# Patient Record
Sex: MALE | Race: BLACK OR AFRICAN AMERICAN | Employment: UNEMPLOYED | URBAN - METROPOLITAN AREA
[De-identification: names, ages, dates, MRNs, and addresses within clinical notes are randomized per-mention and may not be internally consistent; named-entity substitution may affect disease eponyms.]

---

## 2021-01-07 ENCOUNTER — OFFICE VISIT (OUTPATIENT)
Dept: ORTHOPEDIC SURGERY | Age: 46
End: 2021-01-07
Payer: OTHER GOVERNMENT

## 2021-01-07 VITALS
OXYGEN SATURATION: 98 % | HEART RATE: 69 BPM | TEMPERATURE: 97.6 F | HEIGHT: 72 IN | SYSTOLIC BLOOD PRESSURE: 129 MMHG | BODY MASS INDEX: 29.8 KG/M2 | WEIGHT: 220 LBS | DIASTOLIC BLOOD PRESSURE: 80 MMHG

## 2021-01-07 DIAGNOSIS — M48.02 CERVICAL SPINAL STENOSIS: Primary | ICD-10-CM

## 2021-01-07 DIAGNOSIS — M79.18 MYOFASCIAL PAIN: ICD-10-CM

## 2021-01-07 DIAGNOSIS — M47.816 LUMBAR FACET ARTHROPATHY: ICD-10-CM

## 2021-01-07 DIAGNOSIS — M79.2 NEURITIS: ICD-10-CM

## 2021-01-07 DIAGNOSIS — M62.838 MUSCLE SPASM: ICD-10-CM

## 2021-01-07 DIAGNOSIS — M51.36 DDD (DEGENERATIVE DISC DISEASE), LUMBAR: ICD-10-CM

## 2021-01-07 PROCEDURE — 99203 OFFICE O/P NEW LOW 30 MIN: CPT | Performed by: PHYSICAL MEDICINE & REHABILITATION

## 2021-01-07 RX ORDER — GABAPENTIN 300 MG/1
CAPSULE ORAL
Qty: 60 CAP | Refills: 1 | Status: SHIPPED | OUTPATIENT
Start: 2021-01-07 | End: 2021-04-09

## 2021-01-07 NOTE — PATIENT INSTRUCTIONS

## 2021-01-07 NOTE — LETTER
1/8/2021 Patient: Jackie Astudillo YOB: 1975 Date of Visit: 1/7/2021 Isauro Hodges NP 
[address] [address] [place] [address] [address] Fax: [phone] Dear Isauro Hodges NP, Thank you for referring Mr. Jackie Astudillo to [address] [place] [address] for evaluation. My notes for this consultation are attached. If you have questions, please do not hesitate to call me. I look forward to following your patient along with you. Sincerely, Lv Oliva MD

## 2021-01-07 NOTE — PROGRESS NOTES
[facility]  [address] [place] [address], [facility] [address]  Phone: [phone]  Fax: [phone]    NEW PATIENT  Pt's YOB: 1975    ASSESSMENT   Diagnoses and all orders for this visit:    1. Cervical spinal stenosis  -     REFERRAL TO PHYSICAL THERAPY    2. Neuritis  -     REFERRAL TO PHYSICAL THERAPY  -     EMG TWO EXTREMITIES LOWER; Future  -     gabapentin (Neurontin) 300 mg capsule; Take 1 cap by mouth at night for 1 week then increase to 2 caps as directed. Max Daily Amount: 600 mg.    3. Myofascial pain  -     REFERRAL TO PHYSICAL THERAPY    4. Muscle spasm  -     REFERRAL TO PHYSICAL THERAPY    5. DDD (degenerative disc disease), lumbar  -     REFERRAL TO PHYSICAL THERAPY    6. Lumbar facet arthropathy  -     REFERRAL TO PHYSICAL THERAPY         IMPRESSION AND PLAN:  Arturo Velez is a 41 y.o. male with a long history of neck and lower back pain. Pt complains of nagging pain in the neck that intermittently radiates down the right arm. He also complains of numbness in the plantar aspect of both feet every morning but denies any pain or numbness radiating down the legs at this time. 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 He has been taking Tylenol intermittently as needed. 1) He was given information on cervical and lumbar arthritis exercises. 2) Pt was given information on how to use a TheraCane. 3) He was referred to cervical and lumbar physical therapy for range of motion, modalities, and traction-- [facility]. 4) A lower extremity EMG was ordered to further assess for radiculopathy or neuropathy since he has persistent foot numbness-- [facility] also [facility] about [facility] to [facility] this diagnostic study.    5) Pt was prescribed Neurontin 300 mg 2 caps QHS, tapering up as directed for his neuropathic symptoms. 6) Mr. Jaclyn Koenig has a reminder for a \"due or due soon\" health maintenance. I have asked that he contact his primary care provider, UNKNOWN, for follow-up on this health maintenance. 7)  demonstrated consistency with prescribing. Follow-up and Dispositions    · Return in about 6 weeks (around 2/18/2021) for Medication follow up, PT follow up, Diagnostic Test follow up. HISTORY OF PRESENT ILLNESS:  Arthur Stock is a 41 y.o. male with a long history of neck and lower back pain. He presents to the office today as a new patient. Pt complains of nagging pain in the neck that intermittently radiates down the right arm. He reports intermittent numbness in the right arm that extends into the 4th and 5th digits when he rests on his elbows. Pt admits that his pain disrupts his sleep. He also complains of numbness in the plantar aspect of both feet every morning but denies any pain or numbness radiating down the legs at this time. Pt notes that his foot numbness generally improves within the first couple steps after getting out of bed. His symptoms generally improve when standing, lying down, and walking but worsen when sitting, changing positions, sneezing/coughing, lifting, and bending forward. He denies any loss of manual dexterity or bowel/bladder control at this time but admits to occasional issues with balance and difficulty with buttons. Pt also denies any weakness in the arms/legs or dragging his feet but notes that he very occasionally stumbles when walking. 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 Pt reports that he was doing grass drills and notes the next morning, he experienced numbness in his legs/feet. He reports that his symptoms did not improve and when he followed up with medical he was prescribed ibuprofen. Pt notes that he still did not experience relief, had a lumbar MRI, and was told he had a bulging disc.  He notes that he was then medically discharged 6 months after the onset of symptoms, noting he was in the [employer] for about 1 year total. Pt notes that he tried to return to work but was then placed on disability for his lower back. He reports that he followed up with a provider at the [place] for years, underwent a lower extremity EMG in the early 2000's, but did not experience any improvement in his symptoms and discontinued follow up appointments. Pt then returned to the [place] and requests another provider when he pain started to worsen several years ago. He had a left rotator cuff repair in 2017 by Dr. Ana Luisa Nayak with improvement in his range of motion but he notes that he continued to experience nagging pain in the neck and right arm. His provider at the [place] then ordered cervical and lumbar MRI's. He reports minimal relief when attending physical therapy in the past. Pt has been taking Tylenol intermittently as needed and denies taking neuropathic medications. Pt denies any history of glaucoma or renal stones. Pt at this time desires to proceed with physical therapy, medication evaluation, and a lower extremity EMG. Of note, he is a nonsmoker. Pain Scale: 6/10     PCP: UNKNOWN    History reviewed. No pertinent past medical history.      Social History     Socioeconomic History    Marital status: OTHER     Spouse name: Not on file    Number of children: Not on file    Years of education: Not on file    Highest education level: Not on file   Occupational History    Not on file   Social Needs    Financial resource strain: Not on file    Food insecurity     Worry: Not on file     Inability: Not on file    Transportation needs     Medical: Not on file     Non-medical: Not on file   Tobacco Use    Smoking status: Never Smoker    Smokeless tobacco: Never Used   Substance and Sexual Activity    Alcohol use: Not Currently    Drug use: Never    Sexual activity: Not on file   Lifestyle    Physical activity     Days per week: Not on file     Minutes per session: Not on file    Stress: Not on file   Relationships    Social connections     Talks on phone: Not on file     Gets together: Not on file     Attends [employer] service: Not on file     Active member of club or organization: Not on file     Attends meetings of clubs or organizations: Not on file     Relationship status: Not on file    Intimate partner violence     Fear of current or ex partner: Not on file     Emotionally abused: Not on file     Physically abused: Not on file     Forced sexual activity: Not on file   Other Topics Concern    Not on file   Social History Narrative    Not on file           No Known Allergies    REVIEW OF SYSTEMS    Constitutional: Negative for fever, chills, or weight change. Respiratory: Negative for cough or shortness of breath. Cardiovascular: Negative for chest pain or palpitations. Gastrointestinal: Negative for acid reflux, change in bowel habits, or constipation. Genitourinary: Negative for dysuria and flank pain. Musculoskeletal: Positive for cervical and lumbar pain. Skin: Negative for rash. Neurological: Negative for headaches or dizziness. Positive for numbness. Endo/Heme/Allergies: Negative for increased bruising. Psychiatric/Behavioral: Positive for difficulty with sleep. As per HPI    PHYSICAL EXAMINATION  Visit Vitals  /80 (BP 1 Location: Left arm, BP Patient Position: Sitting)   Pulse 69   Temp 97.6 °F (36.4 °C) (Temporal)   Ht 6' (1.829 m)   Wt 220 lb (99.8 kg)   SpO2 98%   BMI 29.84 kg/m²       Constitutional: Awake, alert, and in no acute distress. HEENT: Normocephalic. Atraumatic. Oropharynx is moist and clear. PERRL. EOMI. Sclerae are nonicteric  Cardiovascular: Regular rate and rhythm  Lungs: Clear to auscultation bilaterally  Abdomen: Soft and nontender. Bowel sounds are present  Neurological: 1+ symmetrical DTRs in the upper extremities. 1+ symmetrical DTRs in the lower extremities.  Decreased sensation to light touch in the left arm, otherwise sensation is intact. Negative Aguilar's sign bilaterally. Skin: warm, dry, and intact. Musculoskeletal: Tight across the upper trapezius bilaterally with scattered trigger points. Good range of motion in the cervical spine on all planes. Negative Spurling's test. Tenderness to palpation in the lower lumbar region. Improvement with extension. No change with axial loading. Pain with forward flexion. No pain with internal or external rotation of his hips. Negative straight leg raise bilaterally. No pain with heel or toe walking. No difficulty with the single leg stance bilaterally. Biceps  Triceps Deltoids Wrist Ext Wrist Flex Hand Intrin   Right +4/5 +4/5 +4/5 +4/5 +4/5 +4/5   Left +4/5 +4/5 +4/5 +4/5 +4/5 +4/5      Hip Flex  Quads Hamstrings Ankle DF EHL Ankle PF   Right +4/5 +4/5 +4/5 +4/5 +4/5 +4/5   Left +4/5 +4/5 +4/5 +4/5 +4/5 +4/5     IMAGING:    Cervical spine MRI from 11/19/2020 was personally reviewed with the patient and demonstrated:  IMPRESSION:  Multilevel spondylosis most pronounced at C3-C4 where a posterior disc bulge contacts and deforms the cord. There is severe bilateral neuroforaminal stenosis at this level as well. Replacement of the bone marrow of uncertain significance. Typically, this is an incidental finding due to a nonaggressive  process such as obesity, anemia or even smoking. However, in rare instances, this can be caused by a marrow replacing process such as leukemia and correlation with patient history and laboratory values to rule out a more aggressive process is appreciated. Lumbar spine MRI from 11/19/2020 was personally reviewed with the patient and demonstrated:  IMPRESSION:  1. Multilevel spondylosis most pronounced at L5-S1 where there is near complete loss of the disc height. A circumferential disc bulge may contact the adjacent left S1 nerve root and there is left greater than right neural foraminal stenosis.   2. Replacement of the bone marrow of uncertain significance. Typically, this is an incidental finding due to a nonaggressive  process such as obesity, anemia or even smoking. However, in rare instances, this can be caused by a marrow replacing process such as leukemia and correlation with patient history and laboratory values to rule out a more aggressive process is appreciated. Written by Dickie Lanes, as dictated by Enrique Stoll MD.  I, Dr. Enrique Stoll confirm that all documentation is accurate.

## 2021-01-08 DIAGNOSIS — M79.2 NEURITIS: ICD-10-CM

## 2021-01-14 ENCOUNTER — APPOINTMENT (OUTPATIENT)
Dept: PHYSICAL THERAPY | Age: 46
End: 2021-01-14
Attending: PHYSICAL MEDICINE & REHABILITATION

## 2021-02-24 ENCOUNTER — OFFICE VISIT (OUTPATIENT)
Dept: ORTHOPEDIC SURGERY | Age: 46
End: 2021-02-24
Payer: OTHER GOVERNMENT

## 2021-02-24 VITALS
WEIGHT: 220 LBS | BODY MASS INDEX: 29.8 KG/M2 | HEIGHT: 72 IN | DIASTOLIC BLOOD PRESSURE: 78 MMHG | SYSTOLIC BLOOD PRESSURE: 131 MMHG | HEART RATE: 69 BPM

## 2021-02-24 DIAGNOSIS — R20.0 NUMBNESS OF LEFT FOOT: Primary | ICD-10-CM

## 2021-02-24 DIAGNOSIS — R20.0 NUMBNESS OF LEFT FOOT: ICD-10-CM

## 2021-02-24 PROCEDURE — 95886 MUSC TEST DONE W/N TEST COMP: CPT | Performed by: PHYSICAL MEDICINE & REHABILITATION

## 2021-02-24 PROCEDURE — 95908 NRV CNDJ TST 3-4 STUDIES: CPT | Performed by: PHYSICAL MEDICINE & REHABILITATION

## 2021-02-24 NOTE — PROGRESS NOTES
[address], [address]  [place], [address] [place] [address] Street  Phone: [phone]  Fax: [phone]        Estefania Living  : 1975  PCP: Torsten Murphy  2021    ELECTROMYOGRAPHY AND NERVE CONDUCTION STUDIES    Miguel Ángel Bob was referred by Jie Field for electrodiagnostic evaluation of left foot paraesthesia. NCV & EMG Findings:  Evaluation of the left sural sensory nerve showed prolonged distal peak latency (4.1 ms) and decreased conduction velocity (Calf-Lat Mall, 34 m/s). All remaining nerves (as indicated in the following tables) were within normal limits. All examined muscles (as indicated in the following table) showed no evidence of electrical instability. INTERPRETATION  This is a minimally abnormal electrodiagnostic examination. These findings may be consistent with:   1. Minimal left sural mononeuropathy - this is a typical for age finding that may be a harbinger for peripheral neuropathy    There is no electrodiagnostic evidence of lumbar radiculopathy, lumbosacral plexopathy, or myopathy. CLINICAL INTERPRETATION  His electrodiagnostic finding could possibly explain his foot numbness, but it is unlikely given the lack of severity. HISTORY OF PRESENT ILLNESS  Laura Wylie is a 41 y.o. male. Pt presents today for LLE EMG evaluation of numbness in the plantar aspect of the feet that is worse in the morning. He notes that sometimes he has sharp pains in the BLE into the big toe. His symptoms have been progressive over the years. He initially injured his back in . PAST MEDICAL HISTORY   No past medical history on file. No past surgical history on file. Ronald Ra MEDICATIONS      Current Outpatient Medications   Medication Sig Dispense Refill    gabapentin (Neurontin) 300 mg capsule Take 1 cap by mouth at night for 1 week then increase to 2 caps as directed.  Max Daily Amount: 600 mg. 60 Cap 1        ALLERGIES  No Known Allergies SOCIAL HISTORY    Social History     Socioeconomic History    Marital status: OTHER     Spouse name: Not on file    Number of children: Not on file    Years of education: Not on file    Highest education level: Not on file   Tobacco Use    Smoking status: Never Smoker    Smokeless tobacco: Never Used   Substance and Sexual Activity    Alcohol use: Not Currently    Drug use: Never       FAMILY HISTORY  No family history on file. PHYSICAL EXAMINATION  Visit Vitals  /78   Pulse 69   Ht 6' (1.829 m)   Wt 220 lb (99.8 kg)   BMI 29.84 kg/m²       Pain Assessment  1/7/2021   Location of Pain Back;Neck   Severity of Pain 6   Quality of Pain Aching   Duration of Pain Persistent   Frequency of Pain Constant   Aggravating Factors Bending; Other (Comment)   Aggravating Factors Comment sitting, lifting   Relieving Factors Other (Comment)   Relieving Factors Comment standing, walking   Result of Injury Yes   Work-Related Injury No   Type of Injury Other (Comment)   Type of Injury Comment basic training grass drills           Constitutional:  Well developed, well nourished, in no acute distress. Psychiatric: Affect and mood are appropriate. Integumentary: No rashes or abrasions noted on exposed areas. SPINE/MUSCULOSKELETAL EXAM    On brief examination: Intact sensation.       NCV & EMG Findings:  Nerve Conduction Studies  Anti Sensory Summary Table     Stim Site NR Peak (ms) Norm Peak (ms) O-P Amp (µV) Norm O-P Amp Site1 Site2 Delta-P (ms) Dist (cm) Kush (m/s) Norm Kush (m/s)   Left Sup Fibular Anti Sensory (Ant Lat Mall)   14 cm    3.6 <4.4 15.2 >5.0 14 cm Ant Lat Mall 3.6 14.0 39 >32   Left Sural Anti Sensory (Lat Mall)   Calf    4.1 <4.0 6.5 >5.0 Calf Lat Mall 4.1 14.0 34 >35   Site 2    4.2  3.1            Motor Summary Table     Stim Site NR Onset (ms) Norm Onset (ms) O-P Amp (mV) Norm O-P Amp Site1 Site2 Delta-0 (ms) Dist (cm) Kush (m/s) Norm Kush (m/s)   Left Fibular Motor (Ext Dig Brev)   Ankle 4. 6 <6.1 7.1 >2.5 B Fib Ankle 7.3 34.5 47 >38   B Fib    11.9  6.8  Poplt B Fib 1.4 6.0 43 >40   Poplt    13.3  6.6          Left Tibial Motor (Abd Miller Brev)   Ankle    4.5 <6.1 8.4 >3.0 Knee Ankle 9.7 38.5 40 >35   Knee    14.2  6.6            EMG     Side Muscle Nerve Root Ins Act Fibs Psw Amp Dur Poly Recrt Int Crista Poles Comment   Left VastusMed Femoral L2-4 Nml Nml Nml Nml Nml 0 Nml Nml    Left AntTibialis Dp Br Fibular L4-5 Nml Nml Nml Nml Nml 0 Nml Nml    Left Gastroc Tibial S1-2 Nml Nml Nml Nml Nml 0 Nml Nml    Left ExtHallLong Dp Br Fibular L5, S1 Nml Nml Nml Nml Nml 0 Nml Nml    Left PostTibialis Tibial L5, S1 Nml Nml Nml Nml Nml 0 Nml Nml    Left Lumbo Parasp Up Rami L1-2 Nml Nml Nml         Left Lumbo Parasp Mid Rami L3-4 Nml Nml Nml         Left Lumbo Parasp Low Rami L5-S1 Nml Nml Nml             Nerve Conduction Studies  Anti Sensory Left/Right Comparison     Stim Site L Lat (ms) R Lat (ms) L-R Lat (ms) L Amp (µV) R Amp (µV) L-R Amp (%) Site1 Site2 L Kush (m/s) R Kush (m/s) L-R Kush (m/s)   Sup Fibular Anti Sensory (Ant Lat Mall)   14 cm 3.6   15.2   14 cm Ant Lat Mall 39     Sural Anti Sensory (Lat Mall)   Calf 4.1   6.5   Calf Lat Mall 34     Site 2 4.2   3.1            Motor Left/Right Comparison     Stim Site L Lat (ms) R Lat (ms) L-R Lat (ms) L Amp (mV) R Amp (mV) L-R Amp (%) Site1 Site2 L Kush (m/s) R Kush (m/s) L-R Kush (m/s)   Fibular Motor (Ext Dig Brev)   Ankle 4.6   7.1   B Fib Ankle 47     B Fib 11.9   6.8   Poplt B Fib 43     Poplt 13.3   6.6          Tibial Motor (Abd Miller Brev)   Ankle 4.5   8.4   Knee Ankle 40     Knee 14.2   6.6                Waveforms:                    [facility]  OFFICE PROCEDURE PROGRESS NOTE        Chart reviewed for the following:   Huyen OCONNELL, have reviewed the History, Physical and updated the Allergic reactions for [facility]     TIME OUT performed immediately prior to start of procedure:   Huyen OCONNELL, have performed the following reviews on [facility] prior to the start of the procedure:            * Patient was identified by name and date of birth   * Agreement on procedure being performed was verified  * Risks and Benefits explained to the patient  * Procedure site verified and marked as necessary  * Patient was positioned for comfort  * Consent was signed and verified     Time: 9:42 AM    Date of procedure: 2/24/2021    Procedure performed by:  Ny Black MD    Provider accompanied by: Bear. Patient accompanied by: Self.     How tolerated by patient: tolerated the procedure well with no complications    Post Procedural Pain Scale: 0 - No Hurt    Comments: none    Written by Maynor Velásquez as dictated by Mary Christina MD

## 2021-03-03 NOTE — PROGRESS NOTES
[facility]  [address] [facility], [facility] [place]   Phone: [phone]  Fax: [phone]    Pt's YOB: 1975    ASSESSMENT   Diagnoses and all orders for this visit:    1. Cervical spinal stenosis    2. Left lumbar radiculopathy  -     pregabalin (Lyrica) 75 mg capsule; Take 2 caps by mouth in the morning and 2 at night as directed. 3. Neuritis  -     pregabalin (Lyrica) 75 mg capsule; Take 2 caps by mouth in the morning and 2 at night as directed. 4. Lumbar facet arthropathy         IMPRESSION AND PLAN:  Carin Skelton is a 41 y.o. male with history of cervical and lumbar pain. He complains of intermittent nagging pain in the neck that radiates down the right arm. Pt also complains of pain in the lower back that radiates down the left leg into the great toe. He is attending physical therapy without improvement. Pt also reports minimal relief when taking Neurontin 300 mg 1 cap QAM and 2 caps QHS. 1) Pt was given information on cervical and lumbar arthritis exercises. 2) I would recommend follow up with a oncologist/hematologist regarding his MRI findings. 3) Pt was prescribed Lyrica 75 mg 2 caps QAM and 2 caps QHS, tapering up as directed. 4) Discussed scheduling a left L5 SNRB if needed. 5) He will discontinue physical therapy since it is not effective. 6) Mr. Mathew Caballero has a reminder for a \"due or due soon\" health maintenance. I have asked that he contact his primary care provider, UNKNOWN, for follow-up on this health maintenance. 7) [facility] demonstrated consistency with prescribing. Follow-up and Dispositions    · Return in about 5 weeks (around 4/8/2021) for Medication follow up. HISTORY OF PRESENT ILLNESS:  Carin Skelton is a 41 y.o. male with history of cervical and lumbar pain and presents to the office today for follow up. He complains of intermittent nagging pain in the neck that radiates down the right arm.  Pt also complains of pain in the lower back that radiates down the left leg into the great toe. His leg pain is more severe in the morning and when lying in bed. Pt reports intermittent numbness in the right arm when leaning on his arm and numbness in both legs. He reports minimal relief with physical therapy since his last office visit. Pt denies attending aquatic physical therapy. He had a cervical and lumbar MRI through the [facility] but notes that he has not followed up with anyone since the imaging study. Pt notes that he had lab workup and according to the patient all his results were normal. Of note, he is a nonsmoker. Pt notes that he has discussed lumbar surgery. He denies any previous lumbar injections. He underwent a bilateral lower extremity EMG on 2/24/2021 with Dr. Daksha Ortega which was suggestive of minimal left sural mononeuropathy. Pt reports minimal relief when taking Neurontin 300 mg 1 cap QAM and 2 caps QHS. He ran out of the Neurontin and denies any sedation with the medication. Pt denies a history of kidney stones or glaucoma and has never taking Topamax or Lyrica. He also denies any history of diabetes mellitus. Pt at this time desires to proceed with medication evaluation. Pain Scale: 2/10    PCP: UNKNOWN     History reviewed. No pertinent past medical history.      Social History     Socioeconomic History    Marital status: OTHER     Spouse name: Not on file    Number of children: Not on file    Years of education: Not on file    Highest education level: Not on file   Occupational History    Not on file   Social Needs    Financial resource strain: Not on file    Food insecurity     Worry: Not on file     Inability: Not on file    Transportation needs     Medical: Not on file     Non-medical: Not on file   Tobacco Use    Smoking status: Never Smoker    Smokeless tobacco: Never Used   Substance and Sexual Activity    Alcohol use: Not Currently    Drug use: Never    Sexual activity: Not on file Lifestyle    Physical activity     Days per week: Not on file     Minutes per session: Not on file    Stress: Not on file   Relationships    Social connections     Talks on phone: Not on file     Gets together: Not on file     Attends Gnosticist service: Not on file     Active member of club or organization: Not on file     Attends meetings of clubs or organizations: Not on file     Relationship status: Not on file    Intimate partner violence     Fear of current or ex partner: Not on file     Emotionally abused: Not on file     Physically abused: Not on file     Forced sexual activity: Not on file   Other Topics Concern    Not on file   Social History Narrative    Not on file       Current Outpatient Medications   Medication Sig Dispense Refill    pregabalin (Lyrica) 75 mg capsule Take 2 caps by mouth in the morning and 2 at night as directed. 120 Cap 1    gabapentin (Neurontin) 300 mg capsule Take 1 cap by mouth at night for 1 week then increase to 2 caps as directed. Max Daily Amount: 600 mg. 60 Cap 1       No Known Allergies      REVIEW OF SYSTEMS    Constitutional: Negative for fever, chills, or weight change. Respiratory: Negative for cough or shortness of breath. Cardiovascular: Negative for chest pain or palpitations. Gastrointestinal: Negative for acid reflux, change in bowel habits, or constipation. Genitourinary: Negative for dysuria and flank pain. Musculoskeletal: Positive for lumbar pain. Neurological: Negative for headaches or dizziness. Positive for numbness. Endo/Heme/Allergies: Negative for increased bruising. Psychiatric/Behavioral: Positive for difficulty with sleep. As per HPI    PHYSICAL EXAMINATION  Visit Vitals  /78 (BP 1 Location: Left upper arm)   Pulse 86   Temp 97.3 °F (36.3 °C)   Resp 19   Ht 5' 11\" (1.803 m)   Wt 216 lb (98 kg)   SpO2 99%   BMI 30.13 kg/m²       Constitutional: Awake, alert, and in no acute distress.   Neurological: 1+ symmetrical DTRs in the upper extremities. 1+ symmetrical DTRs in the lower extremities. Sensation to light touch is intact. Negative Aguilar's sign bilaterally. Skin: warm, dry, and intact. Musculoskeletal: Tenderness to palpation in the lower lumbar region. Improvement with extension. No change with axial loading. Pain with forward flexion. No pain with internal or external rotation of his hips. Negative straight leg raise bilaterally. Biceps  Triceps Deltoids Wrist Ext Wrist Flex Hand Intrin   Right +4/5 +4/5 +4/5 +4/5 +4/5 +4/5   Left +4/5 +4/5 +4/5 +4/5 +4/5 +4/5      Hip Flex  Quads Hamstrings Ankle DF EHL Ankle PF   Right +4/5 +4/5 +4/5 +4/5 +4/5 +4/5   Left +4/5 +4/5 +4/5 +4/5 +4/5 +4/5     IMAGING:    Cervical spine MRI from 11/19/2020 was personally reviewed with the patient and demonstrated:  IMPRESSION:  Multilevel spondylosis most pronounced at C3-C4 where a posterior disc bulge contacts and deforms the cord. There is severe bilateral neuroforaminal stenosis at this level as well.      Replacement of the bone marrow of uncertain significance. Typically, this is an incidental finding due to a nonaggressive  process such as obesity, anemia or even smoking. However, in rare instances, this can be caused by a marrow replacing process such as leukemia and correlation with patient history and laboratory values to rule out a more aggressive process is appreciated. Lumbar spine MRI from 11/19/2020 was personally reviewed with the patient and demonstrated:  IMPRESSION:  1. Multilevel spondylosis most pronounced at L5-S1 where there is near complete loss of the disc height. A circumferential disc bulge may contact the adjacent left S1 nerve root and there is left greater than right neural foraminal stenosis. 2. Replacement of the bone marrow of uncertain significance. Typically, this is an incidental finding due to a nonaggressive  process such as obesity, anemia or even smoking.  However, in rare instances, this can be caused by a marrow replacing process such as leukemia and correlation with patient history and laboratory values to rule out a more aggressive process is appreciated. Bilateral lower extremity EMG from 2/24/2021 was personally reviewed with the patient and demonstrated:  NCV & EMG Findings:  Evaluation of the left sural sensory nerve showed prolonged distal peak latency (4.1 ms) and decreased conduction velocity (Calf-Lat Mall, 34 m/s). All remaining nerves (as indicated in the following tables) were within normal limits.       All examined muscles (as indicated in the following table) showed no evidence of electrical instability.       INTERPRETATION  This is a minimally abnormal electrodiagnostic examination. These findings may be consistent with:   1. Minimal left sural mononeuropathy - this is a typical for age finding that may be a harbinger for peripheral neuropathy     There is no electrodiagnostic evidence of lumbar radiculopathy, lumbosacral plexopathy, or myopathy.     CLINICAL INTERPRETATION  His electrodiagnostic finding could possibly explain his foot numbness, but it is unlikely given the lack of severity. Written by Concha Case, as dictated by Reid Joseph MD.  I, Dr. Reid Joseph confirm that all documentation is accurate.

## 2021-03-04 ENCOUNTER — TELEPHONE (OUTPATIENT)
Dept: ORTHOPEDIC SURGERY | Age: 46
End: 2021-03-04

## 2021-03-04 ENCOUNTER — OFFICE VISIT (OUTPATIENT)
Dept: ORTHOPEDIC SURGERY | Age: 46
End: 2021-03-04
Payer: COMMERCIAL

## 2021-03-04 VITALS
SYSTOLIC BLOOD PRESSURE: 115 MMHG | BODY MASS INDEX: 30.24 KG/M2 | TEMPERATURE: 97.3 F | DIASTOLIC BLOOD PRESSURE: 78 MMHG | HEIGHT: 71 IN | RESPIRATION RATE: 19 BRPM | WEIGHT: 216 LBS | OXYGEN SATURATION: 99 % | HEART RATE: 86 BPM

## 2021-03-04 DIAGNOSIS — R93.7 ABNORMAL MRI, CERVICAL SPINE: Primary | ICD-10-CM

## 2021-03-04 DIAGNOSIS — M47.816 LUMBAR FACET ARTHROPATHY: ICD-10-CM

## 2021-03-04 DIAGNOSIS — M54.16 LEFT LUMBAR RADICULOPATHY: ICD-10-CM

## 2021-03-04 DIAGNOSIS — R89.8 ABNORMAL BONE MARROW EXAMINATION: ICD-10-CM

## 2021-03-04 DIAGNOSIS — M79.2 NEURITIS: ICD-10-CM

## 2021-03-04 DIAGNOSIS — M48.02 CERVICAL SPINAL STENOSIS: Primary | ICD-10-CM

## 2021-03-04 DIAGNOSIS — R93.7 ABNORMAL MRI, LUMBAR SPINE: ICD-10-CM

## 2021-03-04 PROCEDURE — 99213 OFFICE O/P EST LOW 20 MIN: CPT | Performed by: PHYSICAL MEDICINE & REHABILITATION

## 2021-03-04 RX ORDER — PREGABALIN 75 MG/1
CAPSULE ORAL
Qty: 120 CAP | Refills: 1 | Status: SHIPPED | OUTPATIENT
Start: 2021-03-04 | End: 2021-03-10 | Stop reason: SDUPTHER

## 2021-03-04 NOTE — PATIENT INSTRUCTIONS

## 2021-03-04 NOTE — LETTER
3/5/2021 Patient: Petr Kiran YOB: 1975 Date of Visit: 3/4/2021 Angi Vela NP 
[address] [facility] [address] [facility] [id number] [address] Fax: [phone] Dear Angi Vela NP, Thank you for referring Mr. Petr Kiran to [address] [place] [address] for evaluation. My notes for this consultation are attached. If you have questions, please do not hesitate to call me. I look forward to following your patient along with you. Sincerely, Nghia White MD

## 2021-03-04 NOTE — TELEPHONE ENCOUNTER
Patient called stating he just left the office but he wanted to get a message to Dr. Baron Burgos. He says he just got off the phone with the [place] explaining to them Dr. Baron Burgos is wanting to refer him to a different specialist and they told him Dr. Baron Burgos should be able to do the referral for him and he's requesting to speak to someone regarding this. Please advise patient back at [phone].

## 2021-03-08 NOTE — TELEPHONE ENCOUNTER
I attempted to reach Mr. Shirley Sprague regarding this referral. He was not available so I left a message requesting a return call. I cannot make a referral directly to Hematology as it is a different provider group. The [facility] referral needs an addendum from the [facility] and should be sent to a participating hematology provider/group. According to the [address] [facility] referral, a stat/urgent referral can be made by contacting extension [phone] or [phone]. I attempted this for approximately 30 minutes with no results as no one would  on either extension. The other option was via e-mail. I sent a secure e-mail requesting a delivery and read receipt. The delivery receipt was returned as follows: Delivery to these recipients or groups is complete, but no delivery notification was sent by the destination :  [email or web address] ([email or web address])  Subject: Stat Referral Request Safemail    The read receipt was returned as follows: Your message      To:      Subject: Stat Referral Request Safemail     Sent: Monday, March 8, 2021 7:06:53 PM (UT+00:00) Sami Barroso   was read on Monday, March 8, 2021 7:06:49 PM (UT+00:00) [place], [place]. A [facility] SARS form was also faxed to [phone] for team 6 to process. Fax confirmation scanned into media. Mr. Shirley Sprague returned my call and I advised him of the actions above. My only other suggestion was for him to contact [facility] office and obtain their direct fax number then I can send the same information to them. They may process the referral quicker. Mr. Shirley Sprague states he will work on it immediately.  Any further notes will be added to the referral.

## 2021-03-08 NOTE — TELEPHONE ENCOUNTER
Per Dr. Trena Rahman ok for [facility] for referral to hematology for abnormal bone marrow appearance on both Cervical and Lumbar MRI. VORB placed as above. Returned call to patient, verified Name/, informed patient of above. Patient inquired if he should keep f/u appt with Dr. Trena Rahman on 2021. Informed patient to keep f/u appt with Dr. Trena Rahman as scheduled, d/t medication refills etc... Patient aware he will be contacted to schedule appt with Hematology. Patient aware this can take up to several weeks. No further action required at this time.

## 2021-03-09 ENCOUNTER — TELEPHONE (OUTPATIENT)
Dept: ORTHOPEDIC SURGERY | Age: 46
End: 2021-03-09

## 2021-03-09 DIAGNOSIS — M54.16 LEFT LUMBAR RADICULOPATHY: ICD-10-CM

## 2021-03-09 DIAGNOSIS — M79.2 NEURITIS: ICD-10-CM

## 2021-03-09 NOTE — TELEPHONE ENCOUNTER
Patient states he spoke with the [place] and he can get the Lyrica cheaper through the [place]. He is requesting we resend to the [place], no fax number available, with a note on the prescription to fill and  at the window. If it does not say that he will have to wait a really long time for them to fill and mail.      Patient [phone]

## 2021-03-10 RX ORDER — PREGABALIN 75 MG/1
CAPSULE ORAL
Qty: 120 CAP | Refills: 1 | Status: SHIPPED | OUTPATIENT
Start: 2021-03-10

## 2021-03-10 NOTE — TELEPHONE ENCOUNTER
Called and canceled patient's Lyrica Rx at [facility], spoke with Delmy Enriquez. New Rx was printed and signed with \"To fill and  at the window\" added. Returned call to patient, verified Name/, informed of above, informed we are unable to fax Rx's as well as send electronically to [place]. Patient requested to  Rx from [facility] location. Informed patient Rx will be available to  after 0800 on tomorrow 2021. Patient verbalized agreement/understanding. No further action required at this time.

## 2021-04-07 NOTE — PROGRESS NOTES
[facility]  [address] [facility], [facility] [place]   Phone: [phone]  Fax: [phone]    Pt's YOB: 1975    ASSESSMENT   Diagnoses and all orders for this visit:    1. Left lumbar radiculopathy  -     pregabalin (Lyrica) 150 mg capsule; Take 1 Cap by mouth three (3) times daily. Max Daily Amount: 450 mg.    2. Neuritis  -     pregabalin (Lyrica) 150 mg capsule; Take 1 Cap by mouth three (3) times daily. Max Daily Amount: 450 mg.    3. Lumbar facet arthropathy  -     methylPREDNISolone (MEDROL DOSEPACK) 4 mg tablet; Per dose pack instructions  -     ketorolac tromethamine (TORADOL) 60 mg/2 mL injection 60 mg  -     THER/PROPH/DIAG INJECTION, SUBCUT/IM    4. Cervical spondylosis  -     methylPREDNISolone (MEDROL DOSEPACK) 4 mg tablet; Per dose pack instructions    5. Foraminal stenosis of cervical region  -     pregabalin (Lyrica) 150 mg capsule; Take 1 Cap by mouth three (3) times daily. Max Daily Amount: 450 mg.         IMPRESSION AND PLAN:  Anuradha Pal is a 41 y.o. male with history of cervical and lumbar pain. He complains of intermittent nagging pain in the left arm, particularly in the upper arm. Pt also complains of pain in the lower back that radiates down the left leg into the great toe. He started Lyrica 75 mg and takes 2 caps BID with sedation or dizziness. 1) Pt was given information on cervical and lumbar arthritis exercises. 2) He will increase his Lyrica 75 mg to 2 caps TID, tapering up as directed. Pt will then change to Lyrica 150 mg 1 cap TID. 3) Pt received a 60 mg Toradol injection in the office today for his inflammation. 4) I recommended the patient use Voltaren gel on his left arm as needed. 5) Pt was prescribed a Medrol Dosepak. 6) Discussed ordering a left upper extremity EMG pending response to medication   7) Mr. Keli Menendez has a reminder for a \"due or due soon\" health maintenance.  I have asked that he contact his primary care provider, Unknown (Inactive), for follow-up on this health maintenance. 8)  demonstrated consistency with prescribing. Follow-up and Dispositions    · Return in about 4 weeks (around 5/6/2021) for Medication follow up. HISTORY OF PRESENT ILLNESS:  Neel Chang is a 41 y.o. male with history of cervical and lumbar pain and presents to the office today for follow up. He complains of intermittent nagging pain in the left arm, particularly in the upper arm. Pt also complains of pain in the lower back that radiates down the left leg into the great toe. Pt notes difficulty with sleep over the last 3 days due to nagging pain in the left arm. He reports relief when sitting and activity and notes his left arm pain generally worsens when lying down. Pt notes he had a right rotator cuff repair after he injured the right arm while working. He started Lyrica 75 mg and takes 2 caps BID with sedation or dizziness. Pt notes that he is scheduled to followed up with a hematologist on 4/23/2021 for evaluation of the abnormal appearance of the bone marrow on his MRI. He denies a history of diabetes mellitus and has not recently taken oral steroids. Pt at this time desires to proceed with medication evaluation. Pain Scale: 4/10    PCP: Unknown (Inactive)     History reviewed. No pertinent past medical history.      Social History     Socioeconomic History    Marital status: OTHER     Spouse name: Not on file    Number of children: Not on file    Years of education: Not on file    Highest education level: Not on file   Occupational History    Not on file   Social Needs    Financial resource strain: Not on file    Food insecurity     Worry: Not on file     Inability: Not on file    Transportation needs     Medical: Not on file     Non-medical: Not on file   Tobacco Use    Smoking status: Never Smoker    Smokeless tobacco: Never Used   Substance and Sexual Activity    Alcohol use: Not Currently  Drug use: Never    Sexual activity: Not on file   Lifestyle    Physical activity     Days per week: Not on file     Minutes per session: Not on file    Stress: Not on file   Relationships    Social connections     Talks on phone: Not on file     Gets together: Not on file     Attends Congregation service: Not on file     Active member of club or organization: Not on file     Attends meetings of clubs or organizations: Not on file     Relationship status: Not on file    Intimate partner violence     Fear of current or ex partner: Not on file     Emotionally abused: Not on file     Physically abused: Not on file     Forced sexual activity: Not on file   Other Topics Concern    Not on file   Social History Narrative    Not on file       Current Outpatient Medications   Medication Sig Dispense Refill    methylPREDNISolone (MEDROL DOSEPACK) 4 mg tablet Per dose pack instructions 1 Dose Pack 0    pregabalin (Lyrica) 150 mg capsule Take 1 Cap by mouth three (3) times daily. Max Daily Amount: 450 mg. 270 Cap 0    pregabalin (Lyrica) 75 mg capsule Take 2 caps by mouth in the morning and 2 at night as directed. To fill and  at the window. 120 Cap 1    gabapentin (Neurontin) 300 mg capsule Take 1 cap by mouth at night for 1 week then increase to 2 caps as directed. Max Daily Amount: 600 mg. 60 Cap 1       No Known Allergies      REVIEW OF SYSTEMS    Constitutional: Negative for fever, chills, or weight change. Respiratory: Negative for cough or shortness of breath. Cardiovascular: Negative for chest pain or palpitations. Gastrointestinal: Negative for acid reflux, change in bowel habits, or constipation. Genitourinary: Negative for dysuria and flank pain. Musculoskeletal: Positive for cervical, lumbar, and left arm pain. Neurological: Negative for headaches, dizziness; positive for  numbness. Endo/Heme/Allergies: Negative for increased bruising.    Psychiatric/Behavioral: Positive for difficulty with sleep. As per HPI    PHYSICAL EXAMINATION  Visit Vitals  BP (!) 124/90 (BP 1 Location: Left upper arm)   Pulse 84   Temp 98.2 °F (36.8 °C)   Resp 18   Ht 5' 11\" (1.803 m)   Wt 223 lb (101.2 kg)   SpO2 97%   BMI 31.10 kg/m²       Constitutional: Awake, alert, and in no acute distress. Neurological: 1+ symmetrical DTRs in the upper extremities. 1+ symmetrical DTRs in the lower extremities. Sensation to light touch is intact. Negative Aguilar's sign bilaterally. Skin: warm, dry, and intact. Musculoskeletal: Good range of motion in both shoulders. Negative empty can test bilaterally. Good strength with resisted abduction and adduction bilaterally. Tenderness to palpation in the lower lumbar region. Improvement with extension. No change with axial loading. Pain with forward flexion. No pain with internal or external rotation of his hips. Negative straight leg raise bilaterally. Biceps  Triceps Deltoids Wrist Ext Wrist Flex Hand Intrin   Right +4/5 +4/5 +4/5 +4/5 +4/5 +4/5   Left +4/5 +4/5 +4/5 +4/5 +4/5 +4/5      Hip Flex  Quads Hamstrings Ankle DF EHL Ankle PF   Right +4/5 +4/5 +4/5 +4/5 +4/5 +4/5   Left +4/5 +4/5 +4/5 +4/5 +4/5 +4/5     TORADOL INJECTION:  Administrations This Visit     ketorolac tromethamine (TORADOL) 60 mg/2 mL injection 60 mg     Admin Date  04/08/2021  09:27 Action  Given Dose  60 mg Route  IntraMUSCular Site  Right Gluteus Aleks Administered By  Dipika Finney LPN    NDC: 01998-804-38    Patient Supplied?: No    Comments: 30mg x2 given                IMAGING:    Cervical spine MRI from 11/19/2020 was personally reviewed with the patient and demonstrated:  IMPRESSION:  Multilevel spondylosis most pronounced at C3-C4 where a posterior disc bulge contacts and deforms the cord. There is severe bilateral neuroforaminal stenosis at this level as well.      Replacement of the bone marrow of uncertain significance.  Typically, this is an incidental finding due to a nonaggressive  process such as obesity, anemia or even smoking. However, in rare instances, this can be caused by a marrow replacing process such as leukemia and correlation with patient history and laboratory values to rule out a more aggressive process is appreciated.     Lumbar spine MRI from 11/19/2020 was personally reviewed with the patient and demonstrated:  IMPRESSION:  1. Multilevel spondylosis most pronounced at L5-S1 where there is near complete loss of the disc height. A circumferential disc bulge may contact the adjacent left S1 nerve root and there is left greater than right neural foraminal stenosis. 2. Replacement of the bone marrow of uncertain significance. Typically, this is an incidental finding due to a nonaggressive  process such as obesity, anemia or even smoking. However, in rare instances, this can be caused by a marrow replacing process such as leukemia and correlation with patient history and laboratory values to rule out a more aggressive process is appreciated.       Bilateral lower extremity EMG from 2/24/2021 was personally reviewed with the patient and demonstrated:  NCV & EMG Findings:  Evaluation of the left sural sensory nerve showed prolonged distal peak latency (4.1 ms) and decreased conduction velocity (Calf-Lat Mall, 34 m/s).  All remaining nerves (as indicated in the following tables) were within normal limits.       All examined muscles (as indicated in the following table) showed no evidence of electrical instability.       INTERPRETATION  This is a minimally abnormal electrodiagnostic examination.  These findings may be consistent with:   1. Minimal left sural mononeuropathy - this is a typical for age finding that may be a harbinger for peripheral neuropathy     There is no electrodiagnostic evidence of lumbar radiculopathy, lumbosacral plexopathy, or myopathy.     CLINICAL INTERPRETATION  His electrodiagnostic finding could possibly explain his foot numbness, but it is unlikely given the lack of severity.       Written by Annelise Zavala, as dictated by Zaynab Fletcher MD.  I, Dr. Zaynab Fletcher confirm that all documentation is accurate.

## 2021-04-08 ENCOUNTER — OFFICE VISIT (OUTPATIENT)
Dept: ORTHOPEDIC SURGERY | Age: 46
End: 2021-04-08
Payer: OTHER GOVERNMENT

## 2021-04-08 VITALS
OXYGEN SATURATION: 97 % | TEMPERATURE: 98.2 F | DIASTOLIC BLOOD PRESSURE: 90 MMHG | WEIGHT: 223 LBS | SYSTOLIC BLOOD PRESSURE: 124 MMHG | BODY MASS INDEX: 31.22 KG/M2 | RESPIRATION RATE: 18 BRPM | HEIGHT: 71 IN | HEART RATE: 84 BPM

## 2021-04-08 DIAGNOSIS — M48.02 FORAMINAL STENOSIS OF CERVICAL REGION: ICD-10-CM

## 2021-04-08 DIAGNOSIS — M47.816 LUMBAR FACET ARTHROPATHY: ICD-10-CM

## 2021-04-08 DIAGNOSIS — M47.812 CERVICAL SPONDYLOSIS: ICD-10-CM

## 2021-04-08 DIAGNOSIS — M54.16 LEFT LUMBAR RADICULOPATHY: Primary | ICD-10-CM

## 2021-04-08 DIAGNOSIS — M79.2 NEURITIS: ICD-10-CM

## 2021-04-08 PROCEDURE — 99213 OFFICE O/P EST LOW 20 MIN: CPT | Performed by: PHYSICAL MEDICINE & REHABILITATION

## 2021-04-08 PROCEDURE — 96372 THER/PROPH/DIAG INJ SC/IM: CPT | Performed by: PHYSICAL MEDICINE & REHABILITATION

## 2021-04-08 RX ORDER — PREGABALIN 150 MG/1
150 CAPSULE ORAL 3 TIMES DAILY
Qty: 270 CAP | Refills: 0 | Status: SHIPPED | OUTPATIENT
Start: 2021-04-08

## 2021-04-08 RX ORDER — PREGABALIN 75 MG/1
150 CAPSULE ORAL 3 TIMES DAILY
Qty: 180 CAP | Refills: 0 | Status: CANCELLED | OUTPATIENT
Start: 2021-04-08

## 2021-04-08 RX ORDER — METHYLPREDNISOLONE 4 MG/1
TABLET ORAL
Qty: 1 DOSE PACK | Refills: 0 | Status: SHIPPED | OUTPATIENT
Start: 2021-04-08

## 2021-04-08 RX ORDER — KETOROLAC TROMETHAMINE 30 MG/ML
60 INJECTION, SOLUTION INTRAMUSCULAR; INTRAVENOUS ONCE
Status: COMPLETED | OUTPATIENT
Start: 2021-04-08 | End: 2021-04-08

## 2021-04-08 RX ADMIN — KETOROLAC TROMETHAMINE 60 MG: 30 INJECTION, SOLUTION INTRAMUSCULAR; INTRAVENOUS at 09:27

## 2021-04-08 NOTE — PATIENT INSTRUCTIONS
